# Patient Record
Sex: MALE | Race: WHITE | NOT HISPANIC OR LATINO | Employment: PART TIME | URBAN - METROPOLITAN AREA
[De-identification: names, ages, dates, MRNs, and addresses within clinical notes are randomized per-mention and may not be internally consistent; named-entity substitution may affect disease eponyms.]

---

## 2017-01-03 ENCOUNTER — TELEPHONE (OUTPATIENT)
Dept: GASTROENTEROLOGY | Facility: CLINIC | Age: 77
End: 2017-01-03

## 2017-01-03 DIAGNOSIS — Z86.010 HISTORY OF COLON POLYPS: Primary | ICD-10-CM

## 2017-01-03 NOTE — TELEPHONE ENCOUNTER
----- Message from Rolando Lehman MD sent at 1/3/2017 10:49 AM CST -----  Contact: Self   Thanks for calling. It was recommended for f/u in three years which will be march. Order has been placed.   ----- Message -----     From: Sue Hernandez MA     Sent: 1/3/2017  10:19 AM       To: Rolando Lehman MD        ----- Message -----     From: Leisa Palacios     Sent: 1/3/2017   9:39 AM       To: [facility] LINK Staff    Pt would like to speak with nurse regarding scheduling his 5 year colonoscopy soon if possible no other information was given.    Pt can be reached at [phone] or [phone]    Thanks

## 2017-01-09 ENCOUNTER — TELEPHONE (OUTPATIENT)
Dept: GASTROENTEROLOGY | Facility: CLINIC | Age: 77
End: 2017-01-09

## 2017-01-09 ENCOUNTER — TELEPHONE (OUTPATIENT)
Dept: ENDOSCOPY | Facility: HOSPITAL | Age: 77
End: 2017-01-09

## 2017-01-09 DIAGNOSIS — Z12.11 SPECIAL SCREENING FOR MALIGNANT NEOPLASM OF COLON: Primary | ICD-10-CM

## 2017-01-09 RX ORDER — SODIUM, POTASSIUM,MAG SULFATES 17.5-3.13G
1 SOLUTION, RECONSTITUTED, ORAL ORAL AS DIRECTED
Qty: 1 BOTTLE | Refills: 0 | Status: SHIPPED | OUTPATIENT
Start: 2017-01-09 | End: 2017-03-24

## 2017-01-09 NOTE — TELEPHONE ENCOUNTER
----- Message from Teresa Owens sent at 1/9/2017  9:49 AM CST -----  Contact: self   Patient is calling to speak with a nurse , he stated he called last week and someone was superpose to contact him regarding an appointment .    It may be regarding a colonoscopy     He can be reached at [phone]

## 2017-03-23 ENCOUNTER — HOSPITAL ENCOUNTER (OUTPATIENT)
Facility: HOSPITAL | Age: 77
Discharge: HOME | End: 2017-03-23
Attending: INTERNAL MEDICINE | Admitting: INTERNAL MEDICINE
Payer: MEDICARE

## 2017-03-23 ENCOUNTER — ANESTHESIA (OUTPATIENT)
Dept: ENDOSCOPY | Facility: HOSPITAL | Age: 77
End: 2017-03-23
Payer: MEDICARE

## 2017-03-23 ENCOUNTER — ANESTHESIA EVENT (OUTPATIENT)
Dept: ENDOSCOPY | Facility: HOSPITAL | Age: 77
End: 2017-03-23
Payer: MEDICARE

## 2017-03-23 ENCOUNTER — SURGERY (OUTPATIENT)
Age: 77
End: 2017-03-23

## 2017-03-23 VITALS
WEIGHT: 185 LBS | DIASTOLIC BLOOD PRESSURE: 62 MMHG | RESPIRATION RATE: 18 BRPM | HEIGHT: 72 IN | OXYGEN SATURATION: 98 % | BODY MASS INDEX: 25.06 KG/M2 | TEMPERATURE: 98 F | SYSTOLIC BLOOD PRESSURE: 110 MMHG | HEART RATE: 63 BPM | RESPIRATION RATE: 13 BRPM

## 2017-03-23 DIAGNOSIS — K62.3 RECTAL PROLAPSE: Primary | ICD-10-CM

## 2017-03-23 DIAGNOSIS — Z86.010 HISTORY OF COLON POLYPS: Primary | ICD-10-CM

## 2017-03-23 PROBLEM — Z86.0100 HISTORY OF COLON POLYPS: Status: ACTIVE | Noted: 2017-03-23

## 2017-03-23 LAB — POCT GLUCOSE: 100 MG/DL (ref 70–110)

## 2017-03-23 PROCEDURE — 27201089 HC SNARE, DISP (ANY): Performed by: INTERNAL MEDICINE

## 2017-03-23 PROCEDURE — 37000009 HC ANESTHESIA EA ADD 15 MINS: Performed by: INTERNAL MEDICINE

## 2017-03-23 PROCEDURE — 63600175 PHARM REV CODE 636 W HCPCS: Performed by: NURSE ANESTHETIST, CERTIFIED REGISTERED

## 2017-03-23 PROCEDURE — 45385 COLONOSCOPY W/LESION REMOVAL: CPT | Mod: PT,,, | Performed by: INTERNAL MEDICINE

## 2017-03-23 PROCEDURE — D9220A PRA ANESTHESIA: Mod: CRNA,,, | Performed by: NURSE ANESTHETIST, CERTIFIED REGISTERED

## 2017-03-23 PROCEDURE — 45385 COLONOSCOPY W/LESION REMOVAL: CPT | Performed by: INTERNAL MEDICINE

## 2017-03-23 PROCEDURE — 88305 TISSUE EXAM BY PATHOLOGIST: CPT | Mod: 26,,, | Performed by: PATHOLOGY

## 2017-03-23 PROCEDURE — 25000003 PHARM REV CODE 250: Performed by: NURSE ANESTHETIST, CERTIFIED REGISTERED

## 2017-03-23 PROCEDURE — 37000008 HC ANESTHESIA 1ST 15 MINUTES: Performed by: INTERNAL MEDICINE

## 2017-03-23 PROCEDURE — 25000003 PHARM REV CODE 250: Performed by: INTERNAL MEDICINE

## 2017-03-23 PROCEDURE — 88305 TISSUE EXAM BY PATHOLOGIST: CPT | Performed by: PATHOLOGY

## 2017-03-23 PROCEDURE — D9220A PRA ANESTHESIA: Mod: ANES,,, | Performed by: ANESTHESIOLOGY

## 2017-03-23 RX ORDER — ASPIRIN 325 MG
325 TABLET ORAL DAILY
COMMUNITY

## 2017-03-23 RX ORDER — TELMISARTAN AND HYDROCHLORTHIAZIDE 40; 12.5 MG/1; MG/1
1 TABLET ORAL DAILY
COMMUNITY

## 2017-03-23 RX ORDER — SODIUM CHLORIDE 9 MG/ML
INJECTION, SOLUTION INTRAVENOUS CONTINUOUS
Status: DISCONTINUED | OUTPATIENT
Start: 2017-03-23 | End: 2017-03-23 | Stop reason: HOSPADM

## 2017-03-23 RX ORDER — LIDOCAINE HCL/PF 100 MG/5ML
SYRINGE (ML) INTRAVENOUS
Status: DISCONTINUED | OUTPATIENT
Start: 2017-03-23 | End: 2017-03-23

## 2017-03-23 RX ORDER — METFORMIN HYDROCHLORIDE 500 MG/1
500 TABLET ORAL 2 TIMES DAILY WITH MEALS
COMMUNITY

## 2017-03-23 RX ORDER — SIMVASTATIN 20 MG/1
20 TABLET, FILM COATED ORAL NIGHTLY
COMMUNITY

## 2017-03-23 RX ORDER — PROPOFOL 10 MG/ML
VIAL (ML) INTRAVENOUS CONTINUOUS PRN
Status: DISCONTINUED | OUTPATIENT
Start: 2017-03-23 | End: 2017-03-23

## 2017-03-23 RX ORDER — PROPOFOL 10 MG/ML
VIAL (ML) INTRAVENOUS
Status: DISCONTINUED | OUTPATIENT
Start: 2017-03-23 | End: 2017-03-23

## 2017-03-23 RX ADMIN — PROPOFOL 200 MCG/KG/MIN: 10 INJECTION, EMULSION INTRAVENOUS at 07:03

## 2017-03-23 RX ADMIN — LIDOCAINE HYDROCHLORIDE 50 MG: 20 INJECTION, SOLUTION INTRAVENOUS at 07:03

## 2017-03-23 RX ADMIN — PROPOFOL 50 MG: 10 INJECTION, EMULSION INTRAVENOUS at 07:03

## 2017-03-23 RX ADMIN — SODIUM CHLORIDE: 0.9 INJECTION, SOLUTION INTRAVENOUS at 07:03

## 2017-03-23 NOTE — IP AVS SNAPSHOT
[facility]  [address]  [facility] [address]  Phone: [phone]           Patient Discharge Instructions     Our goal is to set you up for success. This packet includes information on your condition, medications, and your home care. It will help you to care for yourself so you don't get sicker and need to go back to the hospital.     Please ask your nurse if you have any questions.        There are many details to remember when preparing to leave the hospital. Here is what you will need to do:    1. Take your medicine. If you are prescribed medications, review your Medication List in the following pages. You may have new medications to  at the pharmacy and others that you'll need to stop taking. Review the instructions for how and when to take your medications. Talk with your doctor or nurses if you are unsure of what to do.     2. Go to your follow-up appointments. Specific follow-up information is listed in the following pages. Your may be contacted by a transition nurse or clinical provider about future appointments. Be sure we have all of the phone numbers to reach you, if needed. Please contact your provider's office if you are unable to make an appointment.     3. Watch for warning signs. Your doctor or nurse will give you detailed warning signs to watch for and when to call for assistance. These instructions may also include educational information about your condition. If you experience any of warning signs to your health, call your doctor.               [facility]  Unless otherwise directed by your provider, please contact [facility], our nurse care line that is available for 24/7 assistance.     [phone] (toll-free)    Registered nurses in the [facility] provide clinical advisement, health education, appointment booking, and other advisory services.                    ** Verify the list of medication(s) below is accurate and up  to date. Carry this with you in case of emergency. If your medications have changed, please notify your healthcare provider.             Medication List      CHANGE how you take these medications        Additional Info                      aspirin 325 MG tablet   Refills:  0   Dose:  325 mg   What changed:  Another medication with the same name was removed. Continue taking this medication, and follow the directions you see here.    Instructions:  Take 325 mg by mouth once daily.     Begin Date    AM    Noon    PM    Bedtime         CONTINUE taking these medications        Additional Info                      FLOMAX 0.4 mg Cp24   Refills:  0   Dose:  0.4 mg   Generic drug:  tamsulosin    Instructions:  Take 0.4 mg by mouth as needed. As needed for dysuria     Begin Date    AM    Noon    PM    Bedtime       fluticasone 27.5 mcg/actuation nasal spray   Commonly known as:  VERAMYST   Refills:  0    Instructions:  by Nasal route once daily. 1 spray into each nostril twice daily     Begin Date    AM    Noon    PM    Bedtime       glucosamine-chondroitin 500-400 mg tablet   Refills:  0   Dose:  1 tablet    Instructions:  Take 1 tablet by mouth once daily.     Begin Date    AM    Noon    PM    Bedtime       krill oil-omega-3-dha-epa 300-90 (27-45) mg Cap   Refills:  0   Dose:  1 capsule    Instructions:  Take 1 capsule by mouth once daily.     Begin Date    AM    Noon    PM    Bedtime       metformin 500 MG tablet   Commonly known as:  GLUCOPHAGE   Refills:  0   Dose:  500 mg    Instructions:  Take 500 mg by mouth 2 (two) times daily with meals.     Begin Date    AM    Noon    PM    Bedtime       ONE DAILY MULTIVITAMIN per tablet   Refills:  0   Dose:  1 tablet   Generic drug:  multivitamin    Instructions:  Take 1 tablet by mouth once daily.     Begin Date    AM    Noon    PM    Bedtime       polyethylene glycol 100-7.5-2.691 gram solution   Commonly known as:  MOVIPREP   Quantity:  1 each   Refills:  0   Dose:  1 kit     Instructions:  Take 1 kit by mouth as directed.     Begin Date    AM    Noon    PM    Bedtime       simvastatin 20 MG tablet   Commonly known as:  ZOCOR   Refills:  0   Dose:  20 mg    Instructions:  Take 20 mg by mouth every evening.     Begin Date    AM    Noon    PM    Bedtime       sodium,potassium,mag sulfates 17.5-3.13-1.6 gram Solr   Commonly known as:  SUPREP BOWEL PREP KIT   Quantity:  1 Bottle   Refills:  0   Dose:  1 each    Instructions:  Take 1 each by mouth as directed.     Begin Date    AM    Noon    PM    Bedtime       telmisartan-hydrochlorothiazide 40-12.5 mg per tablet   Commonly known as:  MICARDIS HCT   Refills:  0   Dose:  1 tablet    Instructions:  Take 1 tablet by mouth once daily.     Begin Date    AM    Noon    PM    Bedtime         STOP taking these medications     ezetimibe-simvastatin 10-20 mg 10-20 mg per tablet   Commonly known as:  VYTORIN       omeprazole 20 MG capsule   Commonly known as:  PRILOSEC       valsartan-hydrochlorothiazide 80-12.5 mg per tablet   Commonly known as:  DIOVAN-HCT                  Please bring to all follow up appointments:    1. A copy of your discharge instructions.  2. All medicines you are currently taking in their original bottles.  3. Identification and insurance card.    Please arrive 15 minutes ahead of scheduled appointment time.    Please call 24 hours in advance if you must reschedule your appointment and/or time.          Discharge Instructions     Future Orders    Activity as tolerated     Call MD for:  difficulty breathing, headache or visual disturbances     Call MD for:  extreme fatigue     Call MD for:  hives     Call MD for:  persistent dizziness or light-headedness     Call MD for:  persistent nausea and vomiting     Call MD for:  redness, tenderness, or signs of infection (pain, swelling, redness, odor or green/yellow discharge around incision site)     Call MD for:  severe uncontrolled pain     Call MD for:  temperature >100.4     Diet  general     Questions:    Total calories:      Fat restriction, if any:      Protein restriction, if any:      Na restriction, if any:      Fluid restriction:      Additional restrictions:          Discharge Instructions         Colonoscopy     A camera attached to a flexible tube with a viewing lens is used to take video pictures.     Colonoscopy is a test to view the inside of your lower digestive tract (colon and rectum). Sometimes it can show the last part of the small intestine (ileum). During the test, small pieces of tissue may be removed for testing. This is called a biopsy. Small growths, such as polyps, may also be removed.   Why is colonoscopy done?  The test is done to help look for colon cancer. And it can help find the source of abdominal pain, bleeding, and changes in bowel habits. It may be needed once a year, depending on factors such as your:  · Age  · Health history  · Family health history  · Symptoms  · Results from any prior colonoscopy  Risks and possible complications  These include:  · Bleeding               · A puncture or tear in the colon   · Risks of anesthesia  · A cancer lesion not being seen  Getting ready   To prepare for the test:  · Talk with your healthcare provider about the risks of the test (see below). Also ask your healthcare provider about alternatives to the test.  · Tell your healthcare provider about any medicines you take. Also tell him or her about any health conditions you may have.  · Make sure your rectum and colon are empty for the test. Follow the diet and bowel prep instructions exactly. If you dont, the test may need to be rescheduled.  · Plan for a friend or family member to drive you home after the test.     Colonoscopy provides an inside view of the entire colon.     You may discuss the results with your doctor right away or at a future visit.  During the test   The test is usually done in the hospital on an outpatient basis. This means you go home the same  day. The procedure takes about 30 minutes. During that time:  · You are given relaxing (sedating) medicine through an IV line. You may be drowsy, or fully asleep.  · The healthcare provider will first give you a physical exam to check for anal and rectal problems.  · Then the anus is lubricated and the scope inserted.  · If you are awake, you may have a feeling similar to needing to have a bowel movement. You may also feel pressure as air is pumped into the colon. Its OK to pass gas during the procedure.  · Biopsy, polyp removal, or other treatments may be done during the test.  After the test   You may have gas right after the test. It can help to try to pass it to help prevent later bloating. Your healthcare provider may discuss the results with you right away. Or you may need to schedule a follow-up visit to talk about the results. After the test, you can go back to your normal eating and other activities. You may be tired from the sedation and need to rest for a few hours.  Date Last Reviewed: 11/1/2016  © 3776-7351 [employer]. [address] [place] [address]. All rights reserved. This information is not intended as a substitute for professional medical care. Always follow your healthcare professional's instructions.            Admission Information     Date & Time Provider Department CSN    3/23/2017  6:57 AM Rolando Lehman MD [facility] [id number]      Care Providers     Provider Role Specialty Primary office phone    Rolando Lehman MD Attending Provider Gastroenterology [phone]    Rolando Lehman MD Surgeon  Gastroenterology [phone]      Your Vitals Were     BP Pulse Temp Resp Height Weight    90/53 57 98 °F (36.7 °C) 16 6' (1.829 m) 83.9 kg (185 lb)    SpO2 BMI             99% 25.09 kg/m2         Recent Lab Values     No lab values to display.      Pending Labs     Order Current Status    Specimen to Pathology - Surgery Collected (03/23/17 0805)       Allergies as of 3/23/2017     No Known Allergies      Advance Directives     An advance directive is a document which, in the event you are no longer able to make decisions for yourself, tells your healthcare team what kind of treatment you do or do not want to receive, or who you would like to make those decisions for you.  If you do not currently have an advance directive, [facility] encourages you to create one.  For more information call:  [phone]), [phone] ([phone]),  or log on to [email or web address].        Language Assistance Services     ATTENTION: Language assistance services are available, free of charge. Please call [phone].      ATENCIÓN: Si yessicala espalfredo, tiene a palacios disposición servicios gratuitos de asistencia lingüística. Llame al [phone].     CHÚ Ý: N?u b?n nói Ti?ng Vi?t, có các d?ch v? h? tr? ngôn ng? mi?n phí dành cho b?n. G?i s? [phone].        [facility] Sign-Up     Activating your [facility] account is as easy as 1-2-3!     1) Visit [email or web address], select Sign Up Now, enter this activation code and your date of birth, then select Next.  [email or web address]  Expires: 5/7/2017  6:51 AM      2) Create a username and password to use when you visit [facility] in the future and select a security question in case you lose your password and select Next.    3) Enter your e-mail address and click Sign Up!    Additional Information  If you have questions, please e-mail [email or web address] or call [phone] to talk to our [facility] staff. Remember, [facility] is NOT to be used for urgent needs. For medical emergencies, dial 911.          [facility] complies with applicable Federal civil rights laws and does not discriminate on the basis of race, color, national origin, age, disability, or sex.

## 2017-03-23 NOTE — H&P
[facility]  History & Physical    Subjective:      Chief Complaint/Reason for Admission: f/u polyp    Rolando Gonzalez is a 76 y.o. male.    Past Medical History:   Diagnosis Date    Cancer     skin cancer    Hypertension      Past Surgical History:   Procedure Laterality Date    colon polyps      HERNIA REPAIR      MOUTH SURGERY      skin cancer removal       Family History   Problem Relation Age of Onset    Cancer Father      colon cancer     Social History   Substance Use Topics    Smoking status: Never Smoker    Smokeless tobacco: Not on file    Alcohol use Not on file       PTA Medications   Medication Sig    aspirin 81 MG Chew Take 81 mg by mouth once daily.    ezetimibe-simvastatin 10-20 mg (VYTORIN) 10-20 mg per tablet Take 1 tablet by mouth every evening.    fluticasone (VERAMYST) 27.5 mcg/actuation nasal spray by Nasal route once daily. 1 spray into each nostril twice daily    glucosamine-chondroitin 500-400 mg tablet Take 1 tablet by mouth once daily.    krill oil-omega-3-dha-epa 300-90 (27-45) mg Cap Take 1 capsule by mouth once daily.    multivitamin (ONE DAILY MULTIVITAMIN) per tablet Take 1 tablet by mouth once daily.    omeprazole (PRILOSEC) 20 MG capsule Take 20 mg by mouth once daily.    peg 3350-electrolytes-vit C (MOVIPREP) 100-7.5-2.691 gram PwPk Take 1 kit by mouth as directed.    sodium,potassium,mag sulfates (SUPREP BOWEL PREP KIT) 17.5-3.13-1.6 gram SolR Take 1 each by mouth as directed.    tamsulosin (FLOMAX) 0.4 mg Cp24 Take 0.4 mg by mouth as needed. As needed for dysuria    valsartan-hydrochlorothiazide (DIOVAN-HCT) 80-12.5 mg per tablet Take 1 tablet by mouth once daily.     Review of patient's allergies indicates:  No Known Allergies     Review of Systems   Respiratory: Negative.    Cardiovascular: Negative.        Objective:      Vital Signs (Most Recent)       Vital Signs Range (Last 24H):       Physical Exam   Constitutional: He is  oriented to person, place, and time.   Neck: Normal range of motion.   Cardiovascular: Normal rate and regular rhythm.    Pulmonary/Chest: Effort normal and breath sounds normal.   Neurological: He is alert and oriented to person, place, and time.       Data Review:     ECG: .     Assessment:      Active Hospital Problems    Diagnosis  POA    History of colon polyps [Z86.010]  Not Applicable      Resolved Hospital Problems    Diagnosis Date Resolved POA   No resolved problems to display.       Plan:    Indication for procedure:f/u polyp    ASA:II  Airway normal  Malampati class:per anes    Personal and family history negative for anesthesia problems    Plan:colon  Anesthesia plan: general

## 2017-03-23 NOTE — ANESTHESIA POSTPROCEDURE EVALUATION
Anesthesia Post Evaluation    Patient: Rolando Gonzalez    Procedure(s) Performed: Procedure(s) (LRB):  COLONOSCOPY (N/A)    Final Anesthesia Type: general  Patient location during evaluation: PACU  Patient participation: Yes- Able to Participate  Level of consciousness: awake and alert  Post-procedure vital signs: reviewed and stable  Pain management: adequate  Airway patency: patent  PONV status at discharge: No PONV  Anesthetic complications: no      Cardiovascular status: hemodynamically stable  Respiratory status: unassisted  Hydration status: euvolemic  Follow-up not needed.        Visit Vitals    /62    Pulse 63    Temp 36.7 °C (98 °F)    Resp 18    Ht 6' (1.829 m)    Wt 83.9 kg (185 lb)    SpO2 98%    BMI 25.09 kg/m2       Pain/Chon Score: Pain Assessment Performed: Yes (3/23/2017  8:37 AM)  Presence of Pain: denies (3/23/2017  8:37 AM)  Chon Score: 10 (3/23/2017  8:13 AM)

## 2017-03-23 NOTE — DISCHARGE INSTRUCTIONS
Colonoscopy     A camera attached to a flexible tube with a viewing lens is used to take video pictures.     Colonoscopy is a test to view the inside of your lower digestive tract (colon and rectum). Sometimes it can show the last part of the small intestine (ileum). During the test, small pieces of tissue may be removed for testing. This is called a biopsy. Small growths, such as polyps, may also be removed.   Why is colonoscopy done?  The test is done to help look for colon cancer. And it can help find the source of abdominal pain, bleeding, and changes in bowel habits. It may be needed once a year, depending on factors such as your:  · Age  · Health history  · Family health history  · Symptoms  · Results from any prior colonoscopy  Risks and possible complications  These include:  · Bleeding               · A puncture or tear in the colon   · Risks of anesthesia  · A cancer lesion not being seen  Getting ready   To prepare for the test:  · Talk with your healthcare provider about the risks of the test (see below). Also ask your healthcare provider about alternatives to the test.  · Tell your healthcare provider about any medicines you take. Also tell him or her about any health conditions you may have.  · Make sure your rectum and colon are empty for the test. Follow the diet and bowel prep instructions exactly. If you dont, the test may need to be rescheduled.  · Plan for a friend or family member to drive you home after the test.     Colonoscopy provides an inside view of the entire colon.     You may discuss the results with your doctor right away or at a future visit.  During the test   The test is usually done in the hospital on an outpatient basis. This means you go home the same day. The procedure takes about 30 minutes. During that time:  · You are given relaxing (sedating) medicine through an IV line. You may be drowsy, or fully asleep.  · The healthcare provider will first give you a physical exam to  check for anal and rectal problems.  · Then the anus is lubricated and the scope inserted.  · If you are awake, you may have a feeling similar to needing to have a bowel movement. You may also feel pressure as air is pumped into the colon. Its OK to pass gas during the procedure.  · Biopsy, polyp removal, or other treatments may be done during the test.  After the test   You may have gas right after the test. It can help to try to pass it to help prevent later bloating. Your healthcare provider may discuss the results with you right away. Or you may need to schedule a follow-up visit to talk about the results. After the test, you can go back to your normal eating and other activities. You may be tired from the sedation and need to rest for a few hours.  Date Last Reviewed: 11/1/2016  © 5349-2312 [employer]. [address] [place] [address], [place] [address]. All rights reserved. This information is not intended as a substitute for professional medical care. Always follow your healthcare professional's instructions.

## 2017-03-23 NOTE — ANESTHESIA PREPROCEDURE EVALUATION
03/23/2017  Rolando Gonzalez is a 76 y.o., male.    [facility] Anesthesia Evaluation         Review of Systems      Physical Exam  General:  Well nourished    Airway/Jaw/Neck:  Airway Findings: Mouth Opening: Normal Tongue: Normal  General Airway Assessment: Adult  Mallampati: II  Improves to II with phonation.  TM Distance: Normal, at least 6 cm      Dental:  Dental Findings: In tact   Chest/Lungs:  Chest/Lungs Findings: Clear to auscultation     Heart/Vascular:  Heart Findings: Rate: Normal  Rhythm: Regular Rhythm  Sounds: Normal        Mental Status:  Mental Status Findings:  Cooperative, Alert and Oriented         Anesthesia Plan  Type of Anesthesia, risks & benefits discussed:  Anesthesia Type:  general  Patient's Preference: General  Intra-op Monitoring Plan: standard ASA monitors  Intra-op Monitoring Plan Comments:   Post Op Pain Control Plan:   Post Op Pain Control Plan Comments: Per primary service.   Induction:   IV  Beta Blocker:  Patient is not currently on a Beta-Blocker (No further documentation required).       Informed Consent: Patient understands risks and agrees with Anesthesia plan.  Questions answered. Anesthesia consent signed with patient.  ASA Score: 2     Day of Surgery Review of History & Physical:    H&P update referred to the surgeon.     Anesthesia Plan Notes: Monitoring and airway management plans reviewed.         Ready For Surgery From Anesthesia Perspective.

## 2017-03-23 NOTE — TRANSFER OF CARE
Anesthesia Transfer of Care Note    Patient: Rolando Gonzalez    Procedure(s) Performed: Procedure(s) (LRB):  COLONOSCOPY (N/A)    Patient location: PACU    Anesthesia Type: general    Transport from OR: Transported from OR on room air with adequate spontaneous ventilation    Post pain: adequate analgesia    Post assessment: no apparent anesthetic complications    Post vital signs: stable    Level of consciousness: awake, alert and oriented    Nausea/Vomiting: no nausea/vomiting    Complications: none          Last vitals:   Visit Vitals    BP (!) 90/53    Pulse (!) 57    Temp 36.7 °C (98 °F)    Resp 16    Ht 6' (1.829 m)    Wt 83.9 kg (185 lb)    SpO2 99%    BMI 25.09 kg/m2

## 2017-03-24 ENCOUNTER — OFFICE VISIT (OUTPATIENT)
Dept: SURGERY | Facility: CLINIC | Age: 77
End: 2017-03-24
Payer: MEDICARE

## 2017-03-24 VITALS
WEIGHT: 187.63 LBS | SYSTOLIC BLOOD PRESSURE: 159 MMHG | BODY MASS INDEX: 25.44 KG/M2 | DIASTOLIC BLOOD PRESSURE: 79 MMHG | HEART RATE: 62 BPM

## 2017-03-24 DIAGNOSIS — K62.3 RECTAL PROLAPSE: ICD-10-CM

## 2017-03-24 DIAGNOSIS — K64.2 PROLAPSED INTERNAL HEMORRHOIDS, GRADE 3: Primary | ICD-10-CM

## 2017-03-24 PROCEDURE — 46221 LIGATION OF HEMORRHOID(S): CPT | Mod: PBBFAC | Performed by: COLON & RECTAL SURGERY

## 2017-03-24 PROCEDURE — 46221 LIGATION OF HEMORRHOID(S): CPT | Mod: S$PBB,,, | Performed by: COLON & RECTAL SURGERY

## 2017-03-24 PROCEDURE — 99999 PR PBB SHADOW E&M-EST. PATIENT-LVL II: CPT | Mod: PBBFAC,,, | Performed by: COLON & RECTAL SURGERY

## 2017-03-24 PROCEDURE — 99203 OFFICE O/P NEW LOW 30 MIN: CPT | Mod: 25,S$PBB,, | Performed by: COLON & RECTAL SURGERY

## 2017-03-24 PROCEDURE — 99212 OFFICE O/P EST SF 10 MIN: CPT | Mod: PBBFAC | Performed by: COLON & RECTAL SURGERY

## 2017-03-24 NOTE — LETTER
March 24, 2017      Rolando Lehman MD  [address]  [facility] [address]           [facility]  [address]  [facility] [address]  Phone: [phone]          Patient: Rolando Gonzalez   MR Number: [id number]   YOB: 1940   Date of Visit: 3/24/2017       Dear Dr. Rolando Lehman:    Thank you for referring Rolando Gonzalez to me for evaluation. Attached you will find relevant portions of my assessment and plan of care.    If you have questions, please do not hesitate to call me. I look forward to following Rolando Gonzalez along with you.    Sincerely,    Berhane Hamm MD    Enclosure  CC:  No Recipients    If you would like to receive this communication electronically, please contact [email or web address] or [phone] to request more information on [employer] access.    For providers and/or their staff who would like to refer a patient to [facility], please contact us through our one-stop-shop provider referral line, [facility], at [phone].    If you feel you have received this communication in error or would no longer like to receive these types of communications, please e-mail [email or web address]

## 2017-03-24 NOTE — PROGRESS NOTES
"Subjective:       Patient ID: Rolando Gonzalez is a 76 y.o. male.    Chief Complaint: Rectal Prolapse    HPI New pt.  Referral from Dr. Lehman.  Had RBL 1.5 yrs ago "for an external hemorrhoid".  Has tissue chronically prolapsing.  Biggest complaint is mucous drainage.    Review of Systems   Constitutional: Negative for chills and fever.   Respiratory: Negative for cough and shortness of breath.    Cardiovascular: Negative for chest pain and palpitations.   Genitourinary: Negative for dysuria and urgency.   Neurological: Negative for seizures and numbness.       Objective:      Physical Exam   Constitutional: He is oriented to person, place, and time. He appears well-developed and well-nourished.   HENT:   Head: Normocephalic and atraumatic.   Eyes: Conjunctivae and EOM are normal.   Genitourinary:   Genitourinary Comments: Anorectal: prolapse mucousa.    Anoscopy: same with some granulation tissue.  In response to chronic prolapse.    Post Rubber Band Ligation    1. Pt gave verbal consent for rubber band ligation, which was performed on 2 hemorrhoid(s), pt tolerated Well tolerated by patient..    2. Pt verbalized understanding to increase fiber to 25-50gms/day, drink plenty of water and to have 4-5 sitz baths daily, and that in 3-5 days may have minimal bleeding.    3. Pt will f/u in 6 weeks.    4. Understands to seek immediate treatment for fever, bleeding, pelvic pain, or difficulty urinating.     Musculoskeletal: Normal range of motion. He exhibits no edema.   Neurological: He is alert and oriented to person, place, and time.   Skin: Skin is warm and dry.   Psychiatric: He has a normal mood and affect. His behavior is normal.       Assessment:       1. Prolapsed internal hemorrhoids, grade 3    2. Rectal prolapse        Plan:       F/u 6 weeks.  Further treatment based on response to treatment.      "

## 2017-03-28 ENCOUNTER — TELEPHONE (OUTPATIENT)
Dept: GASTROENTEROLOGY | Facility: CLINIC | Age: 77
End: 2017-03-28

## 2017-03-28 NOTE — TELEPHONE ENCOUNTER
----- Message from Rolando Lehman MD sent at 3/28/2017  2:05 PM CDT -----  Please call, polyp was benign, good news

## 2017-03-30 ENCOUNTER — TELEPHONE (OUTPATIENT)
Dept: ENDOSCOPY | Facility: HOSPITAL | Age: 77
End: 2017-03-30

## 2017-05-29 ENCOUNTER — OFFICE VISIT (OUTPATIENT)
Dept: SURGERY | Facility: CLINIC | Age: 77
End: 2017-05-29
Payer: MEDICARE

## 2017-05-29 VITALS
DIASTOLIC BLOOD PRESSURE: 83 MMHG | BODY MASS INDEX: 25.71 KG/M2 | HEART RATE: 73 BPM | WEIGHT: 189.63 LBS | SYSTOLIC BLOOD PRESSURE: 157 MMHG

## 2017-05-29 DIAGNOSIS — K64.8 INTERNAL HEMORRHOIDS WITH COMPLICATION: Primary | ICD-10-CM

## 2017-05-29 PROCEDURE — 99999 PR PBB SHADOW E&M-EST. PATIENT-LVL II: CPT | Mod: PBBFAC,,, | Performed by: COLON & RECTAL SURGERY

## 2017-05-29 PROCEDURE — 99212 OFFICE O/P EST SF 10 MIN: CPT | Mod: PBBFAC | Performed by: COLON & RECTAL SURGERY

## 2017-05-29 PROCEDURE — 99212 OFFICE O/P EST SF 10 MIN: CPT | Mod: S$PBB,,, | Performed by: COLON & RECTAL SURGERY

## 2017-05-29 NOTE — PROGRESS NOTES
Progress Note   surgery      SUBJECTIVE:   IRMA LOPEZ 76 y.o.male  S/p RBL x2 in clinic 3/24 for prolapsed Grade III internal hemorrhoid and mucous drainage. He states he has not any problems after procedure. His seepage is 90-95% improved. No prolapse, has been taking fiber supplements high fiber diet, and water intake is adequate. He is very happy with results. Denies any problems, has regular BM's     OBJECTIVE:     Vitals:    05/29/17 1048   BP: (!) 157/83   Pulse: 73       Physical Exam:  General: NAD, AAOx3  Lungs:  CTAB, no wheezes, or rales  Heart:  RRR, no M/R/G   Abdomen: soft, NTTP, ND, +BS        ASSESSMENT/PLAN:     Assessment:   IRMA LOPEZ 76 y.o.male s/p RBL x2  · VSS. AF  · Doing well, continue high fiber diet, and PO water intake sitz baths as needed.   · Follow up PRN      I have personally taken the history and examined this patient and agree with the resident's note as stated above.    Discussed indications for further treatment - prolapse, bleeding, mucous drainage.

## 2020-03-25 DIAGNOSIS — Z86.010 HISTORY OF COLON POLYPS: Primary | ICD-10-CM

## 2020-07-06 DIAGNOSIS — Z12.11 SPECIAL SCREENING FOR MALIGNANT NEOPLASMS, COLON: Primary | ICD-10-CM

## 2020-07-06 DIAGNOSIS — Z01.818 PRE-OP TESTING: ICD-10-CM

## 2020-07-06 RX ORDER — POLYETHYLENE GLYCOL 3350, SODIUM SULFATE ANHYDROUS, SODIUM BICARBONATE, SODIUM CHLORIDE, POTASSIUM CHLORIDE 236; 22.74; 6.74; 5.86; 2.97 G/4L; G/4L; G/4L; G/4L; G/4L
4 POWDER, FOR SOLUTION ORAL ONCE
Qty: 4000 ML | Refills: 0 | Status: SHIPPED | OUTPATIENT
Start: 2020-07-06 | End: 2020-07-06

## 2020-07-25 ENCOUNTER — LAB VISIT (OUTPATIENT)
Dept: FAMILY MEDICINE | Facility: CLINIC | Age: 80
End: 2020-07-25
Payer: MEDICARE

## 2020-07-25 DIAGNOSIS — Z01.818 PRE-OP TESTING: ICD-10-CM

## 2020-07-25 PROCEDURE — U0003 INFECTIOUS AGENT DETECTION BY NUCLEIC ACID (DNA OR RNA); SEVERE ACUTE RESPIRATORY SYNDROME CORONAVIRUS 2 (SARS-COV-2) (CORONAVIRUS DISEASE [COVID-19]), AMPLIFIED PROBE TECHNIQUE, MAKING USE OF HIGH THROUGHPUT TECHNOLOGIES AS DESCRIBED BY CMS-2020-01-R: HCPCS

## 2020-07-26 LAB — SARS-COV-2 RNA RESP QL NAA+PROBE: NOT DETECTED

## 2020-07-28 ENCOUNTER — HOSPITAL ENCOUNTER (OUTPATIENT)
Facility: HOSPITAL | Age: 80
Discharge: HOME | End: 2020-07-28
Attending: INTERNAL MEDICINE | Admitting: INTERNAL MEDICINE
Payer: MEDICARE

## 2020-07-28 ENCOUNTER — ANESTHESIA EVENT (OUTPATIENT)
Dept: ENDOSCOPY | Facility: HOSPITAL | Age: 80
End: 2020-07-28
Payer: MEDICARE

## 2020-07-28 ENCOUNTER — ANESTHESIA (OUTPATIENT)
Dept: ENDOSCOPY | Facility: HOSPITAL | Age: 80
End: 2020-07-28
Payer: MEDICARE

## 2020-07-28 VITALS
HEIGHT: 72 IN | TEMPERATURE: 98 F | SYSTOLIC BLOOD PRESSURE: 125 MMHG | RESPIRATION RATE: 17 BRPM | BODY MASS INDEX: 23.16 KG/M2 | OXYGEN SATURATION: 100 % | DIASTOLIC BLOOD PRESSURE: 69 MMHG | WEIGHT: 171 LBS | HEART RATE: 50 BPM

## 2020-07-28 DIAGNOSIS — Z12.11 COLON CANCER SCREENING: ICD-10-CM

## 2020-07-28 DIAGNOSIS — Z86.010 HISTORY OF COLON POLYPS: Primary | ICD-10-CM

## 2020-07-28 LAB — POCT GLUCOSE: 92 MG/DL (ref 70–110)

## 2020-07-28 PROCEDURE — G0105 COLORECTAL SCRN; HI RISK IND: HCPCS | Mod: ,,, | Performed by: INTERNAL MEDICINE

## 2020-07-28 PROCEDURE — 25000003 PHARM REV CODE 250: Performed by: INTERNAL MEDICINE

## 2020-07-28 PROCEDURE — 37000009 HC ANESTHESIA EA ADD 15 MINS: Performed by: INTERNAL MEDICINE

## 2020-07-28 PROCEDURE — G0105 COLORECTAL SCRN; HI RISK IND: ICD-10-PCS | Mod: ,,, | Performed by: INTERNAL MEDICINE

## 2020-07-28 PROCEDURE — 63600175 PHARM REV CODE 636 W HCPCS: Performed by: NURSE ANESTHETIST, CERTIFIED REGISTERED

## 2020-07-28 PROCEDURE — 82962 GLUCOSE BLOOD TEST: CPT | Performed by: INTERNAL MEDICINE

## 2020-07-28 PROCEDURE — G0105 COLORECTAL SCRN; HI RISK IND: HCPCS | Performed by: INTERNAL MEDICINE

## 2020-07-28 PROCEDURE — 25000003 PHARM REV CODE 250: Performed by: NURSE ANESTHETIST, CERTIFIED REGISTERED

## 2020-07-28 PROCEDURE — 37000008 HC ANESTHESIA 1ST 15 MINUTES: Performed by: INTERNAL MEDICINE

## 2020-07-28 PROCEDURE — E9220 PRA ENDO ANESTHESIA: ICD-10-PCS | Mod: ,,, | Performed by: NURSE ANESTHETIST, CERTIFIED REGISTERED

## 2020-07-28 PROCEDURE — E9220 PRA ENDO ANESTHESIA: HCPCS | Mod: ,,, | Performed by: NURSE ANESTHETIST, CERTIFIED REGISTERED

## 2020-07-28 RX ORDER — SODIUM CHLORIDE 9 MG/ML
INJECTION, SOLUTION INTRAVENOUS CONTINUOUS
Status: DISCONTINUED | OUTPATIENT
Start: 2020-07-28 | End: 2020-07-28 | Stop reason: HOSPADM

## 2020-07-28 RX ORDER — GLYCOPYRROLATE 0.2 MG/ML
INJECTION INTRAMUSCULAR; INTRAVENOUS
Status: DISCONTINUED | OUTPATIENT
Start: 2020-07-28 | End: 2020-07-28

## 2020-07-28 RX ORDER — LIDOCAINE HCL/PF 100 MG/5ML
SYRINGE (ML) INTRAVENOUS
Status: DISCONTINUED | OUTPATIENT
Start: 2020-07-28 | End: 2020-07-28

## 2020-07-28 RX ORDER — PROPOFOL 10 MG/ML
VIAL (ML) INTRAVENOUS
Status: DISCONTINUED | OUTPATIENT
Start: 2020-07-28 | End: 2020-07-28

## 2020-07-28 RX ORDER — SODIUM CHLORIDE 9 MG/ML
INJECTION, SOLUTION INTRAVENOUS CONTINUOUS
Status: CANCELLED | OUTPATIENT
Start: 2020-07-28

## 2020-07-28 RX ORDER — PROPOFOL 10 MG/ML
VIAL (ML) INTRAVENOUS CONTINUOUS PRN
Status: DISCONTINUED | OUTPATIENT
Start: 2020-07-28 | End: 2020-07-28

## 2020-07-28 RX ADMIN — SODIUM CHLORIDE: 0.9 INJECTION, SOLUTION INTRAVENOUS at 10:07

## 2020-07-28 RX ADMIN — SODIUM CHLORIDE: 0.9 INJECTION, SOLUTION INTRAVENOUS at 11:07

## 2020-07-28 RX ADMIN — PROPOFOL 125 MCG/KG/MIN: 10 INJECTION, EMULSION INTRAVENOUS at 10:07

## 2020-07-28 RX ADMIN — Medication 80 MG: at 10:07

## 2020-07-28 RX ADMIN — PROPOFOL 60 MG: 10 INJECTION, EMULSION INTRAVENOUS at 10:07

## 2020-07-28 RX ADMIN — GLYCOPYRROLATE 0.1 MG: 0.2 INJECTION, SOLUTION INTRAMUSCULAR; INTRAVENOUS at 10:07

## 2020-07-28 NOTE — H&P
Short Stay Endoscopy History and Physical    PCP - Svetlana Shah MD    Procedure - Colonoscopy  ASA - per anesthesia  Mallampati - per anesthesia  History of Anesthesia problems - no  Family history Anesthesia problems - no   Plan of anesthesia - General    HPI:  This is a 79 y.o. male here for evaluation of : asymptomatic screening exam, family history of colon cancer (father age 62) and personal history of colon polyps,     Last colonoscopy 1 10mm polyp removed adenovillous pathology      ROS:  Constitutional: No fevers, chills, No weight loss  CV: No chest pain  Pulm: No cough, No shortness of breath  GI: see HPI  Derm: No rash    Medical History:  has a past medical history of Cancer and Hypertension.    Surgical History:  has a past surgical history that includes Hernia repair; Mouth surgery; skin cancer removal; colon polyps; Arterial bypass surgry (11/2015); and Colonoscopy (N/A, 3/23/2017).    Family History: family history includes Cancer in his father.. Otherwise no colon cancer, inflammatory bowel disease, or GI malignancies.    Social History:  reports that he has never smoked. He does not have any smokeless tobacco history on file. He reports that he does not drink alcohol or use drugs.    Review of patient's allergies indicates:  No Known Allergies    Medications:   Medications Prior to Admission   Medication Sig Dispense Refill Last Dose    aspirin 325 MG tablet Take 325 mg by mouth once daily.   7/27/2020 at Unknown time    fluticasone (VERAMYST) 27.5 mcg/actuation nasal spray by Nasal route once daily. 1 spray into each nostril twice daily   7/28/2020 at Unknown time    glucosamine-chondroitin 500-400 mg tablet Take 1 tablet by mouth once daily.   7/27/2020 at Unknown time    krill oil-omega-3-dha-epa 300-90 (27-45) mg Cap Take 1 capsule by mouth once daily.   7/27/2020 at Unknown time    metformin (GLUCOPHAGE) 500 MG tablet Take 500 mg by mouth 2 (two) times daily with meals.    7/28/2020 at Unknown time    multivitamin (ONE DAILY MULTIVITAMIN) per tablet Take 1 tablet by mouth once daily.   7/27/2020 at Unknown time    simvastatin (ZOCOR) 20 MG tablet Take 20 mg by mouth every evening.   7/27/2020 at Unknown time    tamsulosin (FLOMAX) 0.4 mg Cp24 Take 0.4 mg by mouth as needed. As needed for dysuria   7/27/2020 at Unknown time    telmisartan-hydrochlorothiazide (MICARDIS HCT) 40-12.5 mg per tablet Take 1 tablet by mouth once daily.   7/27/2020 at Unknown time         Physical Exam:    Vital Signs:   Vitals:    07/28/20 1015   BP: (!) 193/93   Pulse: (!) 57   Resp: 14   Temp: 98.1 °F (36.7 °C)       Gen: NAD, lying comfortably  HENT: NCAT, oropharynx clear  Eyes: anicteric sclerae, EOMI grossly  Neck: supple, no visible masses/goiter  Cardiac: RRR  Lungs: non-labored breathing  Abd: soft, NT/ND, normoactive BS  Ext: no LE edema, warm, well perfused  Skin: skin intact on exposed body parts, no visible rashes, lesions  Neuro: A&Ox4, neuro exam grossly intact, moves all extremities  Psych: appropriate mood, affect        Labs:  No results found for: WBC, HGB, HCT, PLT, CHOL, TRIG, HDL, LDLDIRECT, ALT, AST, NA, K, CL, CREATININE, BUN, CO2, TSH, PSA, INR, GLUF, HGBA1C, MICROALBUR    Plan:  Colonoscopy for CRC screening, history of polyps    I have explained the risks and benefits of endoscopy procedures to the patient including but not limited to bleeding, perforation, infection, and death.  The patient was asked if they understand and allowed to ask any further questions to their satisfaction.    Ayan Martinez MD

## 2020-07-28 NOTE — ANESTHESIA PREPROCEDURE EVALUATION
07/28/2020  Rolando Gonzalez is a 79 y.o., male.  Past Surgical History:   Procedure Laterality Date    ARTERIAL BYPASS SURGRY  11/2015    colon polyps      COLONOSCOPY N/A 3/23/2017    Procedure: COLONOSCOPY;  Surgeon: Rolando Lehman MD;  Location: [facility] ([address] [place] [address]);  Service: Endoscopy;  Laterality: N/A;  pt reports incomplete prolapse of rectum    HERNIA REPAIR      MOUTH SURGERY      skin cancer removal       Past Medical History:   Diagnosis Date    Cancer     skin cancer    Hypertension      2 vessel CABG 2015  Anesthesia Evaluation    I have reviewed the Patient Summary Reports.    I have reviewed the Nursing Notes.    I have reviewed the Medications.     Review of Systems  Anesthesia Hx:  No problems with previous Anesthesia    Hematology/Oncology:  Hematology Normal   Oncology Normal     EENT/Dental:EENT/Dental Normal   Cardiovascular:   Hypertension CABG/stent    Pulmonary:  Pulmonary Normal    Renal/:  Renal/ Normal     Musculoskeletal:  Musculoskeletal Normal    Neurological:  Neurology Normal    Endocrine:   Diabetes, well controlled    Dermatological:  Skin Normal    Psych:  Psychiatric Normal           Physical Exam  General:  Well nourished    Airway/Jaw/Neck:  Airway Findings: Mouth Opening: Normal Tongue: Normal  Mallampati: II  Improves to I with phonation.  TM Distance: Normal, at least 6 cm        Eyes/Ears/Nose:  EYES/EARS/NOSE FINDINGS: Normal     Heart/Vascular:  Heart Findings: Normal Heart murmur: negative Vascular Findings: Normal    Abdomen:  Abdomen Findings: Normal    Musculoskeletal:  Musculoskeletal Findings: Normal   Skin:  Skin Findings: Normal    Mental Status:  Mental Status Findings: Normal        Anesthesia Plan  Type of Anesthesia, risks & benefits discussed:  Anesthesia Type:  general  Patient's Preference: general  Intra-op Monitoring Plan: standard  ASA monitors  Intra-op Monitoring Plan Comments:   Post Op Pain Control Plan: IV/PO Opioids PRN and multimodal analgesia  Post Op Pain Control Plan Comments:   Induction:   IV  Beta Blocker:  Patient is not currently on a Beta-Blocker (No further documentation required).       Informed Consent: Patient understands risks and agrees with Anesthesia plan.  Questions answered. Anesthesia consent signed with patient.  ASA Score: 2     Day of Surgery Review of History & Physical: I have interviewed and examined the patient. I have reviewed the patient's H&P dated:  There are no significant changes.  H&P update referred to the surgeon.         Ready For Surgery From Anesthesia Perspective.

## 2020-07-28 NOTE — [employer] PATIENT INSTRUCTIONS
Discharge Summary/Instructions after an Endoscopic Procedure  Patient Name: Rolando Gonzalez  Patient MRN: [id number]  Patient YOB: 1940 Tuesday, July 28, 2020  Rolando Lehman MD  RESTRICTIONS:  During your procedure today, you received medications for sedation.  These   medications may affect your judgment, balance and coordination.  Therefore,   for 24 hours, you have the following restrictions:   - DO NOT drive a car, operate machinery, make legal/financial decisions,   sign important papers or drink alcohol.    ACTIVITY:  Today: no heavy lifting, straining or running due to procedural   sedation/anesthesia.  The following day: return to full activity including work.  DIET:  Eat and drink normally unless instructed otherwise.     TREATMENT FOR COMMON SIDE EFFECTS:  - Mild abdominal pain, nausea, belching, bloating or excessive gas:  rest,   eat lightly and use a heating pad.  - Sore Throat: treat with throat lozenges and/or gargle with warm salt   water.  - Because air was used during the procedure, expelling large amounts of air   from your rectum or belching is normal.  - If a bowel prep was taken, you may not have a bowel movement for 1-3 days.    This is normal.  SYMPTOMS TO WATCH FOR AND REPORT TO YOUR PHYSICIAN:  1. Abdominal pain or bloating, other than gas cramps.  2. Chest pain.  3. Back pain.  4. Signs of infection such as: chills or fever occurring within 24 hours   after the procedure.  5. Rectal bleeding, which would show as bright red, maroon, or black stools.   (A tablespoon of blood from the rectum is not serious, especially if   hemorrhoids are present.)  6. Vomiting.  7. Weakness or dizziness.  GO DIRECTLY TO THE NEAREST EMERGENCY ROOM IF YOU HAVE ANY OF THE FOLLOWING:      Difficulty breathing              Chills and/or fever over 101 F   Persistent vomiting and/or vomiting blood   Severe abdominal pain   Severe chest pain   Black, tarry stools   Bleeding- more than one tablespoon   Any  other symptom or condition that you feel may need urgent attention  Your doctor recommends these additional instructions:  If any biopsies were taken, your doctors clinic will contact you in 1 to 2   weeks with any results.  - Patient has a contact number available for emergencies.  The signs and   symptoms of potential delayed complications were discussed with the   patient.  Return to normal activities tomorrow.  Written discharge   instructions were provided to the patient.   - Discharge patient to home (ambulatory).   - Resume previous diet.   - Continue present medications.   - Repeat colonoscopy in 5 years for surveillance.  For questions, problems or results please call your physician - Rolando Lehman MD at Work:  [phone].  [facility], EMERGENCY ROOM PHONE NUMBER: [phone]  IF A COMPLICATION OR EMERGENCY SITUATION ARISES AND YOU ARE UNABLE TO REACH   YOUR PHYSICIAN - GO DIRECTLY TO THE EMERGENCY ROOM.  Rolando Lehman MD  7/28/2020 11:16:46 AM  This report has been verified and signed electronically.  [employer]

## 2020-07-28 NOTE — ANESTHESIA POSTPROCEDURE EVALUATION
Anesthesia Post Evaluation    Patient: Rolando Gonzalez    Procedure(s) Performed: Procedure(s) (LRB):  COLONOSCOPY (N/A)    Final Anesthesia Type: general    Patient location during evaluation: GI PACU  Patient participation: Yes- Able to Participate  Level of consciousness: awake and alert  Post-procedure vital signs: reviewed and stable  Pain management: adequate  Airway patency: patent    PONV status at discharge: No PONV  Anesthetic complications: no      Cardiovascular status: stable  Respiratory status: unassisted and spontaneous ventilation  Hydration status: euvolemic  Follow-up not needed.          Vitals Value Taken Time   /69 07/28/20 1148   Temp 36.5 °C (97.7 °F) 07/28/20 1119   Pulse 50 07/28/20 1148   Resp 17 07/28/20 1148   SpO2 100 % 07/28/20 1148         Event Time   Out of Recovery 11:50:31         Pain/Chon Score: Chon Score: 8 (7/28/2020 11:28 AM)

## 2020-07-28 NOTE — TRANSFER OF CARE
Anesthesia Transfer of Care Note    Patient: Rolando Gonzalez    Procedure(s) Performed: Procedure(s) (LRB):  COLONOSCOPY (N/A)    Patient location: PACU    Anesthesia Type: general    Transport from OR: Transported from OR on room air with adequate spontaneous ventilation    Post pain: adequate analgesia    Post assessment: no apparent anesthetic complications and tolerated procedure well    Post vital signs: stable    Level of consciousness: sedated    Nausea/Vomiting: no nausea/vomiting    Complications: none    Transfer of care protocol was followed      Last vitals:   Visit Vitals  BP (!) 95/56 (BP Location: Left arm, Patient Position: Lying)   Pulse (!) 49   Temp 36.5 °C (97.7 °F) (Temporal)   Resp 17   Ht 6' (1.829 m)   Wt 77.6 kg (171 lb)   SpO2 99%   BMI 23.19 kg/m²

## 2025-02-27 ENCOUNTER — TELEPHONE (OUTPATIENT)
Dept: GASTROENTEROLOGY | Facility: CLINIC | Age: 85
End: 2025-02-27
Payer: MEDICARE

## 2025-02-27 NOTE — TELEPHONE ENCOUNTER
----- Message from Zhane sent at 2/26/2025  8:25 AM CST -----  Type:  Sooner Appointment RequestCaller is requesting a sooner appointment.  Caller declined first available appointment listed below.  Caller will not accept being placed on the waitlist and is requesting a message be sent to doctor.Name of Caller: Pt When is the first available appointment? Procedure Symptoms: 5 year Colonoscopy Would the patient rather a call back or a response via [facility]? Call back Best Call Back Number: [phone] Information: Please be advised, pt states that if he could be scheduled either 04/08, 04/15, 05/20 05/27, 06/03, 06/10

## 2025-03-03 ENCOUNTER — TELEPHONE (OUTPATIENT)
Dept: GASTROENTEROLOGY | Facility: CLINIC | Age: 85
End: 2025-03-03
Payer: MEDICARE

## 2025-03-03 NOTE — TELEPHONE ENCOUNTER
----- Message from Rolando Lehman MD sent at 3/3/2025 10:22 AM CST -----  Ok, thank youI have placed an order so the ball will be rolling, but I do want to talk to him in the office  ----- Message -----  From: Sue Hernandez MA  Sent: 3/3/2025  10:19 AM CST  To: Rolando Lehman MD    Spoke with patient.  Scheduled to see you on 4/8 at 2:30.   Stated, considering, his fly hx of colon ca(dad) and his hx with colon polyps, he feels he needs to discuss having the colonoscopy with you.  Cherri  ----- Message -----  From: Rolando Lehman MD  Sent: 3/3/2025  10:04 AM CST  To: Sue Hernandez MA    I think considering his age at 84, that we should have an office visit prior to the scopeOr he could consider getting a cologard with his primary doc in [place]  ----- Message -----  From: Sue Hernandez MA  Sent: 2/27/2025   8:06 AM CST  To: Rolando Lehman MD    Last colonoscopy 7/28/2020.   Your recommendation at that time was repeat in 5 years.   Cherri  ----- Message -----  From: Zhane Hester  Sent: 2/26/2025   8:29 AM CST  To: [facility] Staff    Type:  Sooner Appointment RequestCaller is requesting a sooner appointment.  Caller declined first available appointment listed below.  Caller will not accept being placed on the waitlist and is requesting a message be sent to doctor.Name of Caller: Pt When is the first available appointment? Procedure Symptoms: 5 year Colonoscopy Would the patient rather a call back or a response via [facility]? Call back Best Call Back Number: [phone] Information: Please be advised, pt states that if he could be scheduled either 04/08, 04/15, 05/20 05/27, 06/03, 06/10

## 2025-03-06 ENCOUNTER — TELEPHONE (OUTPATIENT)
Dept: ENDOSCOPY | Facility: HOSPITAL | Age: 85
End: 2025-03-06
Payer: MEDICARE

## 2025-03-06 VITALS — WEIGHT: 171 LBS | BODY MASS INDEX: 23.16 KG/M2 | HEIGHT: 72 IN

## 2025-03-06 DIAGNOSIS — Z86.0100 HISTORY OF COLON POLYPS: Primary | ICD-10-CM

## 2025-03-06 DIAGNOSIS — Z12.11 ENCOUNTER FOR SCREENING COLONOSCOPY: Primary | ICD-10-CM

## 2025-03-06 RX ORDER — SODIUM, POTASSIUM,MAG SULFATES 17.5-3.13G
1 SOLUTION, RECONSTITUTED, ORAL ORAL DAILY
Qty: 1 KIT | Refills: 0 | Status: SHIPPED | OUTPATIENT
Start: 2025-03-06 | End: 2025-03-08

## 2025-03-06 NOTE — TELEPHONE ENCOUNTER
"----- Message from Berna sent at 3/5/2025  8:56 AM CST -----    ----- Message -----  From: Rolando Lehman MD  Sent: 3/3/2025  10:22 AM CST  To: [facility]    Procedure: ColonoscopyDiagnosis: Surveillance colonoscopy - Hx of colon polypsProcedure Timin-12 weeks#If within 4 weeks selected, please julisa as high priority##If greater than 12 weeks, please select "5-12 weeks" and delay sending until 3 months prior to requested date# Location: Any SiteAdditional Scheduling Information: No scheduling concernsPrep Specifications:Standard prepIs the patient taking a GLP-1 Agonist:noHave you attached a patient to this message: no  "

## 2025-03-06 NOTE — TELEPHONE ENCOUNTER
Referral for procedure from [facility]      Spoke to patient to schedule procedure(s) Colonoscopy       Physician to perform procedure(s) Dr. EMELI Lehman  Date of Procedure (s) 4/9/25  Arrival Time 8:00 AM  Time of Procedure(s) 9:00 AM   Location of Procedure(s) [address]   Type of Rx Prep sent to patient: Suprep  Instructions provided to patient via Postal Mail    Patient was informed on the following information and verbalized understanding. Screening questionnaire reviewed with patient and complete. If procedure requires anesthesia, a responsible adult needs to be present to accompany the patient home, patient cannot drive after receiving anesthesia. Appointment details are tentative, especially check-in time. Patient will receive a prep-op call 7 days prior to confirm check-in time for procedure. If applicable the patient should contact their pharmacy to verify Rx for procedure prep is ready for pick-up. Patient was advised to call the scheduling department at [phone] if pharmacy states no Rx is available. Patient was advised to call the endoscopy scheduling department if any questions or concerns arise.       Endoscopy Scheduling Department    .    Colonoscopy Procedure Prep Instructions      Date of procedure: 4/9/25 Arrive at: 8:00AM    Location of Department:   [facility] [address] [place] [address]  Take the Elevators to 2nd Floor Endoscopy Procedural Area    As soon as possible:   your prep from pharmacy and over the counter DULCOLAX LAXATIVE TABLETS     On the day before your procedure   What You CAN do:   You may have clear liquids ONLY -see below for list.     Liquids That Are OK to Drink:   Water  Sports drinks (Gatorade, Power-Aid)  Coffee or tea (no cream or nondairy creamer)  Clear juices without pulp (apple, white grape)  Gelatin desserts (no fruit or toppings)  Clear soda (sprite, coke, ginger ale)  Chicken broth (until 12 midnight the night before  procedure)      What You CANNOT do:   Do not EAT solid food, drink milk or anything   colored red.  Do not drink alcohol.  Do not take oral medications within 1 hour of starting   each dose of SUPREP.  No gum chewing or candy morning of procedure.      Note:   (Please disregard the insert instructions from pharmacy).  SUPREP Bowel Prep Kit is indicated for cleansing of the colon as a preparation for colonoscopy in adults.   Be sure to tell your doctor about all the medicines you take, including prescription and non-prescription medicines, vitamins, and herbal supplements. SUPREP Bowel Prep Kit may affect how other medicines work.  Medication taken by mouth may not be absorbed properly when taken within 1 hour before the start of each dose of SUPREP Bowel Prep Kit.    It is not uncommon to experience some abdominal cramping, nausea and/or vomiting when taking the prep. If you have nausea and/or vomiting while taking the prep, stop drinking for 20 to 30 minutes then continue.    How to take prep:    SUPREP Bowel Prep Kit is a (2-day) prep.   Both 6-ounce bottles are required for a complete preparation for colonoscopy. Dilute the solution concentrate as directed prior to use. You must drink water with each dose of SUPREP, and additional water after each dose.    DOSE 1--Day Before Colonoscopy 4/8/25    Drink at least 6 to 8 glasses of clear liquids from time you wake up until you begin your prep and then continue until bedtime to avoid dehydration.     12:00 pm (NOON) Take four (4) Dulcolax (Bisacodyl) tablets with at least 8 ounces or more of clear liquids.      6:00 pm:    You must complete Steps 1 through 4 using one (1) 6-ounce bottle before going to bed as shown below:    Step 1-Pour ONE (1) 6-ounce bottle of SUPREP liquid into the mixing container.  Step 2-Add cool drinking water to the 16-ounce line on the container and mix.  Step 3-Drink ALL the liquid in the container.  Step 4-You must drink two (2) more  16-ounce containers of water over the next 1 hour.  IMPORTANT: If you experience preparation-related symptoms (for example, nausea, bloating, or cramping), stop, or slow the rate of drinking the additional water until your symptoms decrease.    DOSE 2--Day of the Colonoscopy 4/9/25 at 2-3 AM.    For this dose, repeat Steps 1 through 4 shown above using the other 6-ounce bottle.   You may continue drinking water/clear liquids until   4 hours before your colonoscopy or as directed by the scheduling nurse  5:00AM.    For information about your procedure, two (2) things to view prior to colonoscopy:  Please watch this informational video. It is important to watch this animated consent video prior to your arrival. If you haven't watched the video prior to arriving, you are required to watch it during admission which can causes delays.    Options for viewing:   Using a keyboard:  press and hold the control tab (Ctrl) and left mouse click to follow links.           Colonoscopy Instructional Video                                                                                   OR    Type link address into your web browser's address bar:  [email or web address]/[email or web address]      Educational Booklet with pictures:      Colonoscopy Prep - Liquid      Comments:   .     IMPORTANT INFORMATION TO KNOW BEFORE YOUR PROCEDURE    [facility] 2nd Floor       When you arrive:  If your procedure is Monday - Friday 5am - 7pm - Please enter through the front door near [facility]. Please proceed up the first set of elevators to the 2nd floor where you will check in at the endo registration desk.     If your procedure is on a Saturday (weekend), enter through the back Outpatient entrance. Please note this entrance is diagonal from the Emergency Department entrance.              If your procedure requires the administration of anesthesia, it is necessary for a responsible adult to drive you home.  (Medical Transportation, Uber, Lyft, Taxi, etc. may ONLY be used if a responsible adult is present to accompany you home.  The responsible adult CAN'T be the  of the service).      person must be available to return to pick you up within 15 minutes of being notified of discharge.       Please bring a picture ID, insurance card, & copayment      Take Medications as directed below:        If you are taking the oral medication(s):   Invokana (Canagliflozin), Farxiga (Dapagliflozin), Jardiance (Empagliflozin), Invokamet/Invokamet XR (invokana +metformin), Xigduo (farxiga + metformin), Synjardy XR (jardiance + metformin), hold 3 days prior to your procedure, please stop taking on 4/8/25.         If you begin taking any blood thinning medications, injectable weight loss/diabetes medications (other than insulin) , or Adipex (Phentermine) please contact the endoscopy scheduling department listed below as soon as possible.    If you are diabetic see the attached instruction sheet regarding your medication.     If you take HEART, BLOOD PRESSURE, SEIZURE, PAIN, LUNG (including inhalers/nebulizers), ANTI-REJECTION (transplant patients), or PSYCHIATRIC medications, please take at your regular times with a sip of water or as directed by the scheduling nurse.     Important contact information:    Endoscopy [phone] Hours of operation Monday-Friday 8:00-4:30pm.    Questions about insurance or financial obligations call [phone] or [phone].    If you have questions regarding the prep or need to reschedule, please call [phone]. After hours questions requiring immediate assistance, contact [facility] nurse line at [phone] or [phone].   NOTE:     On occasion, unforeseen circumstances may cause a delay in your procedure start time. We respect your time and appreciate your patience during these circumstances.      Comments:

## 2025-03-11 ENCOUNTER — TELEPHONE (OUTPATIENT)
Dept: ENDOSCOPY | Facility: HOSPITAL | Age: 85
End: 2025-03-11
Payer: MEDICARE

## 2025-03-11 NOTE — TELEPHONE ENCOUNTER
.     Colonoscopy Procedure Prep Instructions        Date of procedure: 4/9/25 Arrive at: 8:00AM     Location of Department:   [facility] [address] [place] [address]  Take the Elevators to 2nd Floor Endoscopy Procedural Area     As soon as possible:   your prep from pharmacy and over the counter DULCOLAX LAXATIVE TABLETS      On the day before your procedure   What You CAN do:   You may have clear liquids ONLY -see below for list.      Liquids That Are OK to Drink:   Water  Sports drinks (Gatorade, Power-Aid)  Coffee or tea (no cream or nondairy creamer)  Clear juices without pulp (apple, white grape)  Gelatin desserts (no fruit or toppings)  Clear soda (sprite, coke, ginger ale)  Chicken broth (until 12 midnight the night before procedure)        What You CANNOT do:   Do not EAT solid food, drink milk or anything   colored red.  Do not drink alcohol.  Do not take oral medications within 1 hour of starting   each dose of SUPREP.  No gum chewing or candy morning of procedure.        Note:   (Please disregard the insert instructions from pharmacy).  SUPREP Bowel Prep Kit is indicated for cleansing of the colon as a preparation for colonoscopy in adults.   Be sure to tell your doctor about all the medicines you take, including prescription and non-prescription medicines, vitamins, and herbal supplements. SUPREP Bowel Prep Kit may affect how other medicines work.  Medication taken by mouth may not be absorbed properly when taken within 1 hour before the start of each dose of SUPREP Bowel Prep Kit.     It is not uncommon to experience some abdominal cramping, nausea and/or vomiting when taking the prep. If you have nausea and/or vomiting while taking the prep, stop drinking for 20 to 30 minutes then continue.     How to take prep:     SUPREP Bowel Prep Kit is a (2-day) prep.   Both 6-ounce bottles are required for a complete preparation for colonoscopy. Dilute the solution concentrate as  directed prior to use. You must drink water with each dose of SUPREP, and additional water after each dose.     DOSE 1--Day Before Colonoscopy 4/8/25     Drink at least 6 to 8 glasses of clear liquids from time you wake up until you begin your prep and then continue until bedtime to avoid dehydration.      12:00 pm (NOON) Take four (4) Dulcolax (Bisacodyl) tablets with at least 8 ounces or more of clear liquids.       6:00 pm:     You must complete Steps 1 through 4 using one (1) 6-ounce bottle before going to bed as shown below:     Step 1-Pour ONE (1) 6-ounce bottle of SUPREP liquid into the mixing container.  Step 2-Add cool drinking water to the 16-ounce line on the container and mix.  Step 3-Drink ALL the liquid in the container.  Step 4-You must drink two (2) more 16-ounce containers of water over the next 1 hour.  IMPORTANT: If you experience preparation-related symptoms (for example, nausea, bloating, or cramping), stop, or slow the rate of drinking the additional water until your symptoms decrease.     DOSE 2--Day of the Colonoscopy 4/9/25 at 2-3 AM.     For this dose, repeat Steps 1 through 4 shown above using the other 6-ounce bottle.   You may continue drinking water/clear liquids until   4 hours before your colonoscopy or as directed by the scheduling nurse  5:00AM.     For information about your procedure, two (2) things to view prior to colonoscopy:  Please watch this informational video. It is important to watch this animated consent video prior to your arrival. If you haven't watched the video prior to arriving, you are required to watch it during admission which can causes delays.     Options for viewing:   Using a keyboard:  press and hold the control tab (Ctrl) and left mouse click to follow links.            Colonoscopy Instructional Video                                                                                   OR     Type link address into your web browser's address  bar:  [email or web address]        Educational Booklet with pictures:        Colonoscopy Prep - Liquid        Comments:   .               IMPORTANT INFORMATION TO KNOW BEFORE YOUR PROCEDURE     [facility] 2nd Floor         When you arrive:  If your procedure is Monday - Friday 5am - 7pm - Please enter through the front door near [facility]. Please proceed up the first set of elevators to the 2nd floor where you will check in at the endo registration desk.      If your procedure is on a Saturday (weekend), enter through the back Outpatient entrance. Please note this entrance is diagonal from the Emergency Department entrance.                  If your procedure requires the administration of anesthesia, it is necessary for a responsible adult to drive you home. (Medical Transportation, Uber, Lyft, Taxi, etc. may ONLY be used if a responsible adult is present to accompany you home.  The responsible adult CAN'T be the  of the service).       person must be available to return to pick you up within 15 minutes of being notified of discharge.         Please bring a picture ID, insurance card, & copayment        Take Medications as directed below:             If you begin taking any blood thinning medications, injectable weight loss/diabetes medications (other than insulin) , or Adipex (Phentermine) please contact the endoscopy scheduling department listed below as soon as possible.     If you are diabetic see the attached instruction sheet regarding your medication.      If you take HEART, BLOOD PRESSURE, SEIZURE, PAIN, LUNG (including inhalers/nebulizers), ANTI-REJECTION (transplant patients), or PSYCHIATRIC medications, please take at your regular times with a sip of water or as directed by the scheduling nurse.      Important contact information:     Endoscopy [phone] Hours of operation Monday-Friday 8:00-4:30pm.     Questions about  insurance or financial obligations call [phone] or [phone].     If you have questions regarding the prep or need to reschedule, please call [phone]. After hours questions requiring immediate assistance, contact [facility] nurse line at [phone] or [phone].   NOTE:      On occasion, unforeseen circumstances may cause a delay in your procedure start time. We respect your time and appreciate your patience during these circumstances.            Comments:                 If you are unsure about any of these instructions, call [facility] at [phone].                                 Oral Medicine Week of Procedure Day of Prep   Day of Procedure            Glyburide       Do NOT take morning of procedure. If you        Glucotrol (Glipizide)   Do NOT take.   take twice daily, take with dinner.        Amaryl (Glimepiride)                                     Glucophage       Do NOT take morning of procedure. Take        Glumetza   Take as   when you start eating again.          Fortamet (Metformin)   prescribed.                                 Januvia (Sitaglipitin)       Do NOT take morning of procedure. Take        Nesina (Aloglipitin)       when you start eating again.          Onglyza (Saxaglipitin)   Take as                  Tradjenta (Linaglipitin)   prescribed.                                 Invokana (Canagliflozin)                      Farxiga (Dapagliflozin)       Do NOT take morning of procedure. Take        Jardiance(Empagliflozin) STOP 2 days before you start prep Do NOT take   when you start eating again.                         Actos (Pioglitazone)   Take as   Do NOT take morning of procedure. Take            prescribed.   when you start eating again.                          Injectable & Combination   Daily Dose   Weekly Dose            Medicine                      Adlyxin (Lixisenatide)   If you take these   For weekly dose, hold dose at least 8 days prior       Byetta (Exenatide)   medications daily   to appointment. You may take the dose after your      Bydureon (Exenatide XL)   on the day of your   procedure.            Ozempic (Semaglutide)   appointment hold                   Trulicity (Dulaglutide)   that dose until                   Victoza (Liraglutide)   after your                  Mounjaro (Tirzepatide)   procedure.                  Alejandra Longoria                      It is important to monitor your blood sugar while doing the bowel preparation. On the day of your bowel prep, when you are on a clear liquid diet, you may drink beverages with sugar as your source of glucose. Be sure to mix the prep with water or sugar free liquid only. Below are instructions on how to adjust your diabetic medications prior to your scheduled procedure. Call the healthcare provider who manages your diabetes if you have questions.      Insulin for Type 1 Diabetes Mellitus   Day of Prep                                          Day of procedure            Basaglar If you use in the morning, take as prescribed.        If you take in the morning,          Lantus If you use in the evening, inject 70% of dose.       inject 80% of dose. If you take         Levemir           in the evenings, inject usual          Toujeo           dose.              Meka Givens Count carbs and adjust dose        Do NOT take morining of procedure.          Apidra accordingly. If not carb counting,        Take when you start eating again.          Humalog 100 take 25% of usual meal dose.                       Humalog 200 May use correction dose every                        Novolog 4 hours. Do NOT use once sugar-free                         liquid prep is started.                                         Insulin Pump SEE SEPARATE PUMP GUIDANCE                                                                                                                        Insulin for Type 2 Diabetes Mellitus   Day of Prep                                          Day of procedure            Basaglar If you use in the morning, take as prescribed.        If you take in the morning,          Lantus If you use in the evening, inject 70% of dose.       inject 80% of dose. If you take         Levemir           in the evenings, inject usual          Toujeo           dose.              Tresiba                                                                     Afrezza Inject 50 % of dose with clear liquid diet.        Do NOT take morning of          Apidra Do NOT use once sugar-free clear liquid prep       procedure. Take when you          Fiasp is started. If you are using a correction scale,        start eating meals again.          Humalog 100 take dose every 4 hours as needed.                        Humalog 200                         Novolog                                           NPH  Inject 50% of breakfast and dinner          Do NOT take morning of           doses with clear liquid diet.          procedure. Take usual dose                     when you eat dinner.                            Regular  Inject 50% of breakfast dose and do NOT take       Do NOT take morning of            dinner dose once sugar-free liquid prep has        procedure. Take usual dose            started. If using correction scale, may take dose       when you eat dinner.            every 6 hours as needed.                                          Novolog Mix 70/30 Inject 50% of breakfast dose. Inject 25%       Do NOT take breakfast dose.          Humolog Mix 75/25 of dinner dose.          Take usual dose when you eat          Humolog Mix 50/50           dinner.                                U500 Take 50% of AM or breakfast unit julisa dose.       Do NOT take mornining of            Take 25% of lunch or dinner or PM unit julisa dose.        procedure. Take when you                       start eating again.                              V-Go  Continue to wear your V-Go device. Do NOT click        Coninue to wear your V-Go           once sugar-free clear liquid prep is started.        device. Resume clicks with                      meals.                                                  Revised 3.4.24

## 2025-03-11 NOTE — TELEPHONE ENCOUNTER
----- Message from Berna sent at 3/11/2025  3:04 PM CDT -----    ----- Message -----  From: Sue Hernandez MA  Sent: 3/11/2025  10:20 AM CDT  To: [facility] Endoscopy Schedulers      ----- Message -----  From: Citlalli Chamberlain  Sent: 3/11/2025  10:18 AM CDT  To: Dominik Rolando LINK Staff    Type:  Needs Medical AdviceWho Called: Mr Marshall Would the patient rather a call back or a response via [facility]? Call Best Call Back Number: [phone] Information: needs to have the office mail his instructions to him again for his colonoscopy please call He also, needs to know about his appt he is starting the prep on the day of his appt needs to know if he can start it after his appt with the provider please call

## 2025-04-04 ENCOUNTER — TELEPHONE (OUTPATIENT)
Dept: GASTROENTEROLOGY | Facility: CLINIC | Age: 85
End: 2025-04-04
Payer: MEDICARE

## 2025-04-04 NOTE — TELEPHONE ENCOUNTER
----- Message from Dolores sent at 4/4/2025 11:45 AM CDT -----  Regarding: Return call  Contact: pt [phone]  Type:  Patient Returning CallWho Called:Rolando return callWho Left Message for Patient:Suevikash Oneil the patient know what this is regarding?:regarding appt Would the patient rather a call back or a response via [facility]? Call back Best Call Back Number:pt [phone] Additional Information:

## 2025-04-04 NOTE — TELEPHONE ENCOUNTER
----- Message from Citlalli sent at 4/4/2025  2:48 PM CDT -----  Type:  Patient Returning CallWho Called:Mr Marshall Who Left Message for Patient:Sue Does the patient know what this is regarding?:yes Would the patient rather a call back or a response via [facility]? Call Best Call Back Number: [phone] Information: needs to speak back to you please call

## 2025-04-04 NOTE — TELEPHONE ENCOUNTER
Spoke with patient.  Checking to see if  had any cancellation earlier on 4/8.  As of today he does not.   Patient will check in early just in case someone does not show.   He will be starting his colonoscopy prep at noon that day.   Cherri

## 2025-04-08 ENCOUNTER — ANESTHESIA EVENT (OUTPATIENT)
Dept: ENDOSCOPY | Facility: HOSPITAL | Age: 85
End: 2025-04-08
Payer: MEDICARE

## 2025-04-08 ENCOUNTER — OFFICE VISIT (OUTPATIENT)
Dept: GASTROENTEROLOGY | Facility: CLINIC | Age: 85
End: 2025-04-08
Payer: MEDICARE

## 2025-04-08 VITALS
SYSTOLIC BLOOD PRESSURE: 204 MMHG | DIASTOLIC BLOOD PRESSURE: 91 MMHG | WEIGHT: 176.38 LBS | BODY MASS INDEX: 23.89 KG/M2 | HEART RATE: 51 BPM | HEIGHT: 72 IN

## 2025-04-08 DIAGNOSIS — Z80.0 FAMILY HISTORY OF COLON CANCER: ICD-10-CM

## 2025-04-08 DIAGNOSIS — Z86.0100 HISTORY OF COLON POLYPS: Primary | ICD-10-CM

## 2025-04-08 PROCEDURE — 99213 OFFICE O/P EST LOW 20 MIN: CPT | Mod: S$PBB,,, | Performed by: INTERNAL MEDICINE

## 2025-04-08 PROCEDURE — 99999 PR PBB SHADOW E&M-EST. PATIENT-LVL III: CPT | Mod: PBBFAC,,, | Performed by: INTERNAL MEDICINE

## 2025-04-08 PROCEDURE — 99213 OFFICE O/P EST LOW 20 MIN: CPT | Mod: PBBFAC | Performed by: INTERNAL MEDICINE

## 2025-04-08 RX ORDER — LIDOCAINE HYDROCHLORIDE 10 MG/ML
1 INJECTION, SOLUTION EPIDURAL; INFILTRATION; INTRACAUDAL; PERINEURAL ONCE
OUTPATIENT
Start: 2025-04-08 | End: 2025-04-08

## 2025-04-08 RX ORDER — SODIUM CHLORIDE 9 MG/ML
INJECTION, SOLUTION INTRAVENOUS CONTINUOUS
OUTPATIENT
Start: 2025-04-08

## 2025-04-08 NOTE — PROGRESS NOTES
Subjective:       Patient ID: Rolando Gonzalez is a 84 y.o. male.    Chief Complaint: GI Problem (Hx of colon polyps//discuss colonoscopy)    GI Problem    84-year-old gentleman.  Comes here from [place].  I was able to review his outside records.  His last office visit with a GI doctor there in [place] in 2019 for some postinflammatory IBS diarrhea.  He saw Dr. Sánchez at that time.    He has been coming down here to [place] for colonoscopies since the early 70s.  And I have been doing his colonoscopies probably since the early .  He does have a family history of colon cancer.  His father  from colon cancer at age 62.  His father did not have any siblings with colon cancer.  Nor has the patient had any siblings with colon cancer.    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  Curiously did have in 2019 an emergency surgery for repair of a paraesophageal hernia, it sounds like this presented as chest pain it probably was a gastric volvulus.  But he has had no symptoms since that time.  Denies any heartburn or reflux.  No dysphagia.  No postprandial discomfort.  No nausea or vomiting.  He does have normal bowel movements.  It does not suffer with constipation or diarrhea.  Does have a history of remote cholecystectomy as well.    Also has a history of coronary bypass surgery, is regularly followed up by his cardiologist.  No other new issues there.    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    Past Medical History:   Diagnosis Date    Cancer     skin cancer    Hypertension   "      Review of patient's allergies indicates:  No Known Allergies     Family History   Problem Relation Name Age of Onset    Cancer Father          colon cancer       Social History[1]     Review of Systems    CMP No results found for: "NA", "K", "CL", "CO2", "GLU", "BUN", "CREATININE", "CALCIUM", "PROT", "ALBUMIN", "BILITOT", "ALKPHOS", "AST", "ALT", "ANIONGAP", "ESTGFRAFRICA", "EGFRNONAA" and CBC No results found for: "WBC", "HGB", "HCT", "PLT"    No results found for this or any previous visit from the past 365 days.             Objective:      Physical Exam  Cardiovascular:      Rate and Rhythm: Normal rate and regular rhythm.   Pulmonary:      Effort: Pulmonary effort is normal.      Breath sounds: Normal breath sounds.         Assessment & Plan:       History of colon polyps    Family history of colon cancer     Assessment.  We discussed whether or not to proceed with continued surveillance for colon cancer.  We discussed the risk and benefit of colonoscopy..  On the negative side it is a difficult colon to do from a technical standpoint because of the tortuous colon.  I have no reason to believe that has changed.  I am encouraged that his last colonoscopy was free of any polyps but with the somewhat flat polyps in the prior 2 exams, we decided together that it is reasonable to proceed with colonoscopy again.  From a overall health standpoint he is otherwise in very good shape.    This note was created with voice recognition dictation technology.  There may be errors that I did not see, detect or correct.      Rolando Lehman MD          [1]   Social History  Tobacco Use    Smoking status: Never   Substance Use Topics    Alcohol use: No    Drug use: No     "

## 2025-04-09 ENCOUNTER — HOSPITAL ENCOUNTER (OUTPATIENT)
Facility: HOSPITAL | Age: 85
Discharge: HOME | End: 2025-04-09
Attending: INTERNAL MEDICINE | Admitting: INTERNAL MEDICINE
Payer: MEDICARE

## 2025-04-09 ENCOUNTER — ANESTHESIA (OUTPATIENT)
Dept: ENDOSCOPY | Facility: HOSPITAL | Age: 85
End: 2025-04-09
Payer: MEDICARE

## 2025-04-09 VITALS
RESPIRATION RATE: 12 BRPM | SYSTOLIC BLOOD PRESSURE: 151 MMHG | TEMPERATURE: 98 F | DIASTOLIC BLOOD PRESSURE: 77 MMHG | OXYGEN SATURATION: 100 % | HEART RATE: 46 BPM

## 2025-04-09 DIAGNOSIS — Z86.0100 HISTORY OF COLON POLYPS: ICD-10-CM

## 2025-04-09 PROCEDURE — 45380 COLONOSCOPY AND BIOPSY: CPT | Mod: PT,,, | Performed by: INTERNAL MEDICINE

## 2025-04-09 PROCEDURE — 37000008 HC ANESTHESIA 1ST 15 MINUTES: Performed by: INTERNAL MEDICINE

## 2025-04-09 PROCEDURE — 63600175 PHARM REV CODE 636 W HCPCS: Performed by: STUDENT IN AN ORGANIZED HEALTH CARE EDUCATION/TRAINING PROGRAM

## 2025-04-09 PROCEDURE — 25000003 PHARM REV CODE 250: Performed by: STUDENT IN AN ORGANIZED HEALTH CARE EDUCATION/TRAINING PROGRAM

## 2025-04-09 PROCEDURE — 37000009 HC ANESTHESIA EA ADD 15 MINS: Performed by: INTERNAL MEDICINE

## 2025-04-09 PROCEDURE — 88305 TISSUE EXAM BY PATHOLOGIST: CPT | Mod: TC | Performed by: INTERNAL MEDICINE

## 2025-04-09 PROCEDURE — 45380 COLONOSCOPY AND BIOPSY: CPT | Mod: PT | Performed by: INTERNAL MEDICINE

## 2025-04-09 PROCEDURE — 27201012 HC FORCEPS, HOT/COLD, DISP: Performed by: INTERNAL MEDICINE

## 2025-04-09 RX ORDER — LIDOCAINE HYDROCHLORIDE 20 MG/ML
INJECTION, SOLUTION EPIDURAL; INFILTRATION; INTRACAUDAL; PERINEURAL
Status: DISCONTINUED
Start: 2025-04-09 | End: 2025-04-09 | Stop reason: HOSPADM

## 2025-04-09 RX ORDER — PROPOFOL 10 MG/ML
VIAL (ML) INTRAVENOUS
Status: DISCONTINUED | OUTPATIENT
Start: 2025-04-09 | End: 2025-04-09

## 2025-04-09 RX ORDER — PROPOFOL 10 MG/ML
VIAL (ML) INTRAVENOUS
Status: DISCONTINUED
Start: 2025-04-09 | End: 2025-04-09 | Stop reason: HOSPADM

## 2025-04-09 RX ORDER — LIDOCAINE HYDROCHLORIDE 20 MG/ML
INJECTION INTRAVENOUS
Status: DISCONTINUED | OUTPATIENT
Start: 2025-04-09 | End: 2025-04-09

## 2025-04-09 RX ORDER — SODIUM CHLORIDE 9 MG/ML
INJECTION, SOLUTION INTRAVENOUS CONTINUOUS
Status: CANCELLED | OUTPATIENT
Start: 2025-04-09

## 2025-04-09 RX ADMIN — PROPOFOL 20 MG: 10 INJECTION, EMULSION INTRAVENOUS at 09:04

## 2025-04-09 RX ADMIN — SODIUM CHLORIDE: 0.9 INJECTION, SOLUTION INTRAVENOUS at 09:04

## 2025-04-09 RX ADMIN — PROPOFOL 40 MG: 10 INJECTION, EMULSION INTRAVENOUS at 09:04

## 2025-04-09 RX ADMIN — LIDOCAINE HYDROCHLORIDE 60 MG: 20 INJECTION, SOLUTION INTRAVENOUS at 09:04

## 2025-04-09 NOTE — ANESTHESIA PREPROCEDURE EVALUATION
04/09/2025  Rolando Gonzalez is a 84 y.o., male.      Pre-op Assessment    I have reviewed the Patient Summary Reports.     I have reviewed the Nursing Notes.       Review of Systems  Anesthesia Hx:  No problems with previous Anesthesia             Denies Family Hx of Anesthesia complications.    Denies Personal Hx of Anesthesia complications.                    Social:  Non-Smoker       Cardiovascular:  Exercise tolerance: good   Hypertension   CABG/stent  Denies Dysrhythmias.   Denies Angina.         H/o CABG 2015 02/24 Echo: EF 64%; PAP 45                           Pulmonary:         Pulmonary HTN               Hepatic/GI:  Hepatic/GI Normal                    Neurological:  Neurology Normal                                      Endocrine:  Endocrine Normal                Physical Exam  General: Well nourished, Cooperative, Alert and Oriented    Airway:  Mallampati: II   Mouth Opening: Normal  TM Distance: 4 - 6 cm  Tongue: Normal  Neck ROM: Normal ROM    Dental:  Intact    Chest/Lungs:  Normal Respiratory Rate, Clear to auscultation    Heart:  Rate: Normal  Rhythm: Regular Rhythm      Wt Readings from Last 3 Encounters:   04/08/25 80 kg (176 lb 5.9 oz)   03/06/25 77.6 kg (171 lb)   07/28/20 77.6 kg (171 lb)     Temp Readings from Last 3 Encounters:   07/28/20 36.5 °C (97.7 °F) (Temporal)   03/23/17 36.7 °C (98 °F)   03/11/14 36.6 °C (97.9 °F) (Oral)     BP Readings from Last 3 Encounters:   04/08/25 (!) 204/91   07/28/20 125/69   05/29/17 (!) 157/83     Pulse Readings from Last 3 Encounters:   04/08/25 (!) 51   07/28/20 (!) 50   05/29/17 73         Anesthesia Plan  Type of Anesthesia, risks & benefits discussed:    Anesthesia Type: Gen Natural Airway, MAC, Gen ETT  Intra-op Monitoring Plan: Standard ASA Monitors  Post Op Pain Control Plan: multimodal analgesia  Induction:  IV  Informed Consent:  Informed consent signed with the Patient and all parties understand the risks and agree with anesthesia plan.  All questions answered.   ASA Score: 2  Day of Surgery Review of History & Physical: H&P Update referred to the surgeon/provider.    Ready For Surgery From Anesthesia Perspective.     .

## 2025-04-09 NOTE — PLAN OF CARE
Procedure and recovery complete. Pt awake and alert. MD Lehman at bedside, procedure findings and suggestions discussed. Discharge instructions given, pt verbalized understanding of instruction. Iv was D/c'd, inner cannula intact. No distress noted. No c/o pain. Gait steady, able to ambulate without assistance. Pt walked out accompanied by wife.

## 2025-04-09 NOTE — H&P
Short Stay Endoscopy History and Physical    PCP - Britany Darden MD    Procedure - Colonoscopy  ASA - per anesthesia  Mallampati - per anesthesia  Plan of anesthesia - MAC    HPI:  This is a 84 y.o. male here for evaluation of : personal history of colon polyps    ROS:  Constitutional: No fevers, chills  CV: No chest pain  Pulm: No cough  Ophtho: No vision changes  GI: see HPI  Derm: No rash    Medical History:  has a past medical history of Cancer and Hypertension.    Surgical History:  has a past surgical history that includes Hernia repair; Mouth surgery; skin cancer removal; colon polyps; Arterial bypass surgry (11/2015); Colonoscopy (N/A, 3/23/2017); and Colonoscopy (N/A, 7/28/2020).    Family History: family history includes Cancer in his father.. Otherwise no colon cancer, inflammatory bowel disease, or GI malignancies.    Social History:  reports that he has never smoked. He does not have any smokeless tobacco history on file. He reports that he does not drink alcohol and does not use drugs.    Review of patient's allergies indicates:  No Known Allergies    Medications:   Prescriptions Prior to Admission[1]      Vital Signs:   Vitals:    04/09/25 0744   BP: (!) 181/83   Pulse:    Resp:    Temp:        General Appearance: Well appearing in no acute distress  Eyes:    No scleral icterus  ENT: atraumatic  Abdomen: Soft, nondistended  Extremities: no tenderness  Skin: normal color    Labs:  Lab Results   Component Value Date    HGBA1C 5.9 09/26/2023       I have explained the risks and benefits of endoscopy procedures to the patient/their POA including but not limited to bleeding, perforation, infection, and death.  The patient/their POA was asked if they understand and allowed to ask any further questions to their satisfaction.    Proceed with colonoscopy    Marcellus Valdez MD         [1]   Medications Prior to Admission   Medication Sig Dispense Refill Last Dose/Taking    aspirin 325 MG tablet Take 325  mg by mouth once daily.   Past Week    simvastatin (ZOCOR) 20 MG tablet Take 20 mg by mouth every evening.   4/8/2025    telmisartan-hydrochlorothiazide (MICARDIS HCT) 40-12.5 mg per tablet Take 1 tablet by mouth once daily.   4/9/2025 Morning    fluticasone (VERAMYST) 27.5 mcg/actuation nasal spray by Nasal route once daily. 1 spray into each nostril twice daily       glucosamine-chondroitin 500-400 mg tablet Take 1 tablet by mouth once daily.       krill oil-omega-3-dha-epa 300-90 (27-45) mg Cap Take 1 capsule by mouth once daily.       metformin (GLUCOPHAGE) 500 MG tablet Take 500 mg by mouth 2 (two) times daily with meals.       multivitamin (ONE DAILY MULTIVITAMIN) per tablet Take 1 tablet by mouth once daily.       tamsulosin (FLOMAX) 0.4 mg Cp24 Take 0.4 mg by mouth as needed. As needed for dysuria

## 2025-04-09 NOTE — PROVATION PATIENT INSTRUCTIONS
Discharge Summary/Instructions after an Endoscopic Procedure  Patient Name: Rolando Gonzalez  Patient MRN: [id number]  Patient YOB: 1940 Wednesday, April 9, 2025  Rolando Lehman MD  Dear patient,  As a result of recent federal legislation (The Federal Cures Act), you may   receive lab or pathology results from your procedure in your [facility]   account before your physician is able to contact you. Your physician or   their representative will relay the results to you with their   recommendations at their soonest availability.  Thank you,  RESTRICTIONS:  During your procedure today, you received medications for sedation.  These   medications may affect your judgment, balance and coordination.  Therefore,   for 24 hours, you have the following restrictions:   - DO NOT drive a car, operate machinery, make legal/financial decisions,   sign important papers or drink alcohol.    ACTIVITY:  Today: no heavy lifting, straining or running due to procedural   sedation/anesthesia.  The following day: return to full activity including work.  DIET:  Eat and drink normally unless instructed otherwise.     TREATMENT FOR COMMON SIDE EFFECTS:  - Mild abdominal pain, nausea, belching, bloating or excessive gas:  rest,   eat lightly and use a heating pad.  - Sore Throat: treat with throat lozenges and/or gargle with warm salt   water.  - Because air was used during the procedure, expelling large amounts of air   from your rectum or belching is normal.  - If a bowel prep was taken, you may not have a bowel movement for 1-3 days.    This is normal.  SYMPTOMS TO WATCH FOR AND REPORT TO YOUR PHYSICIAN:  1. Abdominal pain or bloating, other than gas cramps.  2. Chest pain.  3. Back pain.  4. Signs of infection such as: chills or fever occurring within 24 hours   after the procedure.  5. Rectal bleeding, which would show as bright red, maroon, or black stools.   (A tablespoon of blood from the rectum is not serious, especially if    hemorrhoids are present.)  6. Vomiting.  7. Weakness or dizziness.  GO DIRECTLY TO THE NEAREST EMERGENCY ROOM IF YOU HAVE ANY OF THE FOLLOWING:      Difficulty breathing              Chills and/or fever over 101 F   Persistent vomiting and/or vomiting blood   Severe abdominal pain   Severe chest pain   Black, tarry stools   Bleeding- more than one tablespoon   Any other symptom or condition that you feel may need urgent attention  Your doctor recommends these additional instructions:  If any biopsies were taken, your doctors clinic will contact you in 1 to 2   weeks with any results.  - Discharge patient to home (ambulatory).   - Patient has a contact number available for emergencies.  The signs and   symptoms of potential delayed complications were discussed with the   patient.  Return to normal activities tomorrow.  Written discharge   instructions were provided to the patient.   - Resume previous diet.   - Continue present medications.   - Return to primary care physician as previously scheduled.   - Repeat colonoscopy in 5 years for surveillance. after discussion in   office  For questions, problems or results please call your physician - Rolando Lehman MD at Work:  [phone].  [facility] can be reached at [phone]     IF A COMPLICATION OR EMERGENCY SITUATION ARISES AND YOU ARE UNABLE TO REACH   YOUR PHYSICIAN - GO DIRECTLY TO THE EMERGENCY ROOM.  Rolando Lehman MD  4/9/2025 9:46:28 AM  This report has been verified and signed electronically.  Dear patient,  As a result of recent federal legislation (The Federal Cures Act), you may   receive lab or pathology results from your procedure in your [facility]   account before your physician is able to contact you. Your physician or   their representative will relay the results to you with their   recommendations at their soonest availability.  Thank you,  PROVATION

## 2025-04-09 NOTE — ANESTHESIA POSTPROCEDURE EVALUATION
Anesthesia Post Evaluation    Patient: Rolando Gonzalez    Procedure(s) Performed: Procedure(s) (LRB):  COLONOSCOPY, SCREENING, LOW RISK PATIENT (N/A)    Final Anesthesia Type: general      Patient location during evaluation: GI PACU  Patient participation: Yes- Able to Participate  Level of consciousness: awake and alert  Post-procedure vital signs: reviewed and stable  Pain management: adequate  Airway patency: patent    PONV status at discharge: No PONV  Anesthetic complications: no      Cardiovascular status: hemodynamically stable  Respiratory status: unassisted and spontaneous ventilation  Hydration status: euvolemic  Follow-up not needed.              Vitals Value Taken Time   /77 04/09/25 10:20   Temp 36.4 °C (97.5 °F) 04/09/25 09:48   Pulse 46 04/09/25 10:20   Resp 12 04/09/25 10:20   SpO2 100 % 04/09/25 10:20         Event Time   Out of Recovery 10:32:33         Pain/Chon Score: Chon Score: 10 (4/9/2025 10:20 AM)

## 2025-04-09 NOTE — TRANSFER OF CARE
Anesthesia Transfer of Care Note    Patient: Rolando Gonzalez    Procedure(s) Performed: Procedure(s) (LRB):  COLONOSCOPY, SCREENING, LOW RISK PATIENT (N/A)    Patient location: GI    Anesthesia Type: general    Transport from OR: Transported from OR on room air with adequate spontaneous ventilation    Post pain: adequate analgesia    Post assessment: no apparent anesthetic complications and tolerated procedure well    Post vital signs: stable    Level of consciousness: lethargic and responds to stimulation    Nausea/Vomiting: no nausea/vomiting    Complications: none    Transfer of care protocol was followed      Last vitals: Visit Vitals  BP (!) 99/58   Pulse (!) 50   Temp 36.4 °C (97.5 °F)   Resp 18   SpO2 99%

## 2025-04-11 LAB
DHEA SERPL-MCNC: NORMAL
ESTROGEN SERPL-MCNC: NORMAL PG/ML
INSULIN SERPL-ACNC: NORMAL U[IU]/ML
LAB AP CLINICAL INFORMATION: NORMAL
LAB AP GROSS DESCRIPTION: NORMAL
LAB AP PERFORMING LOCATION(S): NORMAL
LAB AP REPORT FOOTNOTES: NORMAL